# Patient Record
Sex: MALE | Race: BLACK OR AFRICAN AMERICAN | NOT HISPANIC OR LATINO | Employment: OTHER | URBAN - METROPOLITAN AREA
[De-identification: names, ages, dates, MRNs, and addresses within clinical notes are randomized per-mention and may not be internally consistent; named-entity substitution may affect disease eponyms.]

---

## 2023-08-27 PROBLEM — R94.31 ABNORMAL ELECTROCARDIOGRAM: Status: ACTIVE | Noted: 2023-08-27

## 2023-08-27 PROBLEM — R25.2 LEG CRAMPS: Status: ACTIVE | Noted: 2023-08-27

## 2023-08-27 PROBLEM — N40.0 BPH (BENIGN PROSTATIC HYPERPLASIA): Status: ACTIVE | Noted: 2023-08-27

## 2023-08-27 PROBLEM — R33.9 INCOMPLETE BLADDER EMPTYING: Status: ACTIVE | Noted: 2023-08-27

## 2023-08-27 PROBLEM — R14.0 ABDOMINAL BLOATING: Status: ACTIVE | Noted: 2023-08-27

## 2023-08-27 PROBLEM — R10.9 ABDOMINAL DISCOMFORT: Status: ACTIVE | Noted: 2023-08-27

## 2023-08-27 PROBLEM — I10 HYPERTENSION: Status: ACTIVE | Noted: 2023-08-27

## 2023-08-27 PROBLEM — R97.20 ELEVATED PSA: Status: ACTIVE | Noted: 2023-08-27

## 2023-08-27 PROBLEM — K58.9 IBS (IRRITABLE BOWEL SYNDROME): Status: ACTIVE | Noted: 2023-08-27

## 2023-08-27 PROBLEM — K64.9 HEMORRHOIDS: Status: ACTIVE | Noted: 2023-08-27

## 2023-08-27 PROBLEM — M21.40 PES PLANUS: Status: ACTIVE | Noted: 2023-08-27

## 2023-08-27 PROBLEM — E78.5 DYSLIPIDEMIA: Status: ACTIVE | Noted: 2023-08-27

## 2023-08-27 PROBLEM — R00.2 PALPITATIONS: Status: ACTIVE | Noted: 2023-08-27

## 2023-08-27 PROBLEM — K63.5 COLON POLYP: Status: ACTIVE | Noted: 2023-08-27

## 2023-08-27 PROBLEM — N13.8 BPH WITH OBSTRUCTION/LOWER URINARY TRACT SYMPTOMS: Status: ACTIVE | Noted: 2023-08-27

## 2023-08-27 PROBLEM — R07.9 CHEST PAIN: Status: ACTIVE | Noted: 2023-08-27

## 2023-08-27 PROBLEM — R94.39 ABNORMAL STRESS TEST: Status: ACTIVE | Noted: 2023-08-27

## 2023-08-27 PROBLEM — K63.89 BACTERIAL OVERGROWTH SYNDROME: Status: ACTIVE | Noted: 2023-08-27

## 2023-08-27 PROBLEM — S93.609A FOOT SPRAIN: Status: ACTIVE | Noted: 2023-08-27

## 2023-08-27 PROBLEM — R53.83 FATIGUE: Status: ACTIVE | Noted: 2023-08-27

## 2023-08-27 PROBLEM — M79.673 FOOT PAIN: Status: ACTIVE | Noted: 2023-08-27

## 2023-08-27 PROBLEM — S16.1XXA CERVICAL STRAIN: Status: ACTIVE | Noted: 2023-08-27

## 2023-08-27 PROBLEM — N40.1 BPH WITH OBSTRUCTION/LOWER URINARY TRACT SYMPTOMS: Status: ACTIVE | Noted: 2023-08-27

## 2023-08-27 PROBLEM — R42 DIZZINESS, NONSPECIFIC: Status: ACTIVE | Noted: 2023-08-27

## 2023-08-27 PROBLEM — K21.9 ESOPHAGEAL REFLUX: Status: ACTIVE | Noted: 2023-08-27

## 2023-08-27 PROBLEM — M54.50 LUMBAR BACK PAIN: Status: ACTIVE | Noted: 2023-08-27

## 2023-08-27 PROBLEM — R73.9 ELEVATED BLOOD SUGAR: Status: ACTIVE | Noted: 2023-08-27

## 2023-08-27 PROBLEM — R07.81 RIB PAIN ON RIGHT SIDE: Status: ACTIVE | Noted: 2023-08-27

## 2023-08-27 RX ORDER — LISINOPRIL AND HYDROCHLOROTHIAZIDE 10; 12.5 MG/1; MG/1
1 TABLET ORAL DAILY
COMMUNITY
End: 2023-12-06 | Stop reason: SDUPTHER

## 2023-08-29 LAB
ALANINE AMINOTRANSFERASE (SGPT) (U/L) IN SER/PLAS: 19 U/L (ref 10–52)
ALBUMIN (G/DL) IN SER/PLAS: 4 G/DL (ref 3.4–5)
ALKALINE PHOSPHATASE (U/L) IN SER/PLAS: 40 U/L (ref 33–136)
ANION GAP IN SER/PLAS: 12 MMOL/L (ref 10–20)
APPEARANCE, URINE: CLEAR
ASPARTATE AMINOTRANSFERASE (SGOT) (U/L) IN SER/PLAS: 20 U/L (ref 9–39)
BASOPHILS (10*3/UL) IN BLOOD BY AUTOMATED COUNT: 0.04 X10E9/L (ref 0–0.1)
BASOPHILS/100 LEUKOCYTES IN BLOOD BY AUTOMATED COUNT: 0.9 % (ref 0–2)
BILIRUBIN TOTAL (MG/DL) IN SER/PLAS: 0.7 MG/DL (ref 0–1.2)
BILIRUBIN, URINE: NEGATIVE
BLOOD, URINE: ABNORMAL
CALCIUM (MG/DL) IN SER/PLAS: 8.6 MG/DL (ref 8.6–10.3)
CARBON DIOXIDE, TOTAL (MMOL/L) IN SER/PLAS: 26 MMOL/L (ref 21–32)
CHLORIDE (MMOL/L) IN SER/PLAS: 103 MMOL/L (ref 98–107)
COLOR, URINE: YELLOW
CREATININE (MG/DL) IN SER/PLAS: 1.19 MG/DL (ref 0.5–1.3)
EOSINOPHILS (10*3/UL) IN BLOOD BY AUTOMATED COUNT: 0.19 X10E9/L (ref 0–0.4)
EOSINOPHILS/100 LEUKOCYTES IN BLOOD BY AUTOMATED COUNT: 4.4 % (ref 0–6)
ERYTHROCYTE DISTRIBUTION WIDTH (RATIO) BY AUTOMATED COUNT: 13.3 % (ref 11.5–14.5)
ERYTHROCYTE MEAN CORPUSCULAR HEMOGLOBIN CONCENTRATION (G/DL) BY AUTOMATED: 31.8 G/DL (ref 32–36)
ERYTHROCYTE MEAN CORPUSCULAR VOLUME (FL) BY AUTOMATED COUNT: 84 FL (ref 80–100)
ERYTHROCYTES (10*6/UL) IN BLOOD BY AUTOMATED COUNT: 5.34 X10E12/L (ref 4.5–5.9)
GFR MALE: 64 ML/MIN/1.73M2
GLUCOSE (MG/DL) IN SER/PLAS: 111 MG/DL (ref 74–99)
GLUCOSE, URINE: NEGATIVE MG/DL
HEMATOCRIT (%) IN BLOOD BY AUTOMATED COUNT: 44.6 % (ref 41–52)
HEMOGLOBIN (G/DL) IN BLOOD: 14.2 G/DL (ref 13.5–17.5)
IMMATURE GRANULOCYTES/100 LEUKOCYTES IN BLOOD BY AUTOMATED COUNT: 0.2 % (ref 0–0.9)
KETONES, URINE: NEGATIVE MG/DL
LEUKOCYTE ESTERASE, URINE: NEGATIVE
LEUKOCYTES (10*3/UL) IN BLOOD BY AUTOMATED COUNT: 4.3 X10E9/L (ref 4.4–11.3)
LYMPHOCYTES (10*3/UL) IN BLOOD BY AUTOMATED COUNT: 1.18 X10E9/L (ref 0.8–3)
LYMPHOCYTES/100 LEUKOCYTES IN BLOOD BY AUTOMATED COUNT: 27.6 % (ref 13–44)
MONOCYTES (10*3/UL) IN BLOOD BY AUTOMATED COUNT: 0.48 X10E9/L (ref 0.05–0.8)
MONOCYTES/100 LEUKOCYTES IN BLOOD BY AUTOMATED COUNT: 11.2 % (ref 2–10)
NEUTROPHILS (10*3/UL) IN BLOOD BY AUTOMATED COUNT: 2.38 X10E9/L (ref 1.6–5.5)
NEUTROPHILS/100 LEUKOCYTES IN BLOOD BY AUTOMATED COUNT: 55.7 % (ref 40–80)
NITRITE, URINE: NEGATIVE
PH, URINE: 7 (ref 5–8)
PLATELETS (10*3/UL) IN BLOOD AUTOMATED COUNT: 252 X10E9/L (ref 150–450)
POTASSIUM (MMOL/L) IN SER/PLAS: 3.6 MMOL/L (ref 3.5–5.3)
PROTEIN TOTAL: 6.7 G/DL (ref 6.4–8.2)
PROTEIN, URINE: NEGATIVE MG/DL
RBC, URINE: <1 /HPF (ref 0–5)
SODIUM (MMOL/L) IN SER/PLAS: 137 MMOL/L (ref 136–145)
SPECIFIC GRAVITY, URINE: 1.01 (ref 1–1.03)
UREA NITROGEN (MG/DL) IN SER/PLAS: 11 MG/DL (ref 6–23)
UROBILINOGEN, URINE: <2 MG/DL (ref 0–1.9)
WBC, URINE: 1 /HPF (ref 0–5)

## 2023-11-14 ENCOUNTER — APPOINTMENT (OUTPATIENT)
Dept: CARDIOLOGY | Facility: CLINIC | Age: 73
End: 2023-11-14

## 2023-12-06 ENCOUNTER — OFFICE VISIT (OUTPATIENT)
Dept: OTOLARYNGOLOGY | Facility: CLINIC | Age: 73
End: 2023-12-06
Payer: MEDICARE

## 2023-12-06 VITALS
HEART RATE: 76 BPM | SYSTOLIC BLOOD PRESSURE: 119 MMHG | WEIGHT: 164 LBS | BODY MASS INDEX: 27.32 KG/M2 | HEIGHT: 65 IN | TEMPERATURE: 98.1 F | DIASTOLIC BLOOD PRESSURE: 72 MMHG

## 2023-12-06 DIAGNOSIS — J33.9 NASAL POLYP: Primary | ICD-10-CM

## 2023-12-06 DIAGNOSIS — J34.89 NASAL OBSTRUCTION: ICD-10-CM

## 2023-12-06 PROCEDURE — 1036F TOBACCO NON-USER: CPT | Performed by: NURSE PRACTITIONER

## 2023-12-06 PROCEDURE — 99214 OFFICE O/P EST MOD 30 MIN: CPT | Performed by: NURSE PRACTITIONER

## 2023-12-06 PROCEDURE — 99204 OFFICE O/P NEW MOD 45 MIN: CPT | Performed by: NURSE PRACTITIONER

## 2023-12-06 PROCEDURE — 1159F MED LIST DOCD IN RCRD: CPT | Performed by: NURSE PRACTITIONER

## 2023-12-06 PROCEDURE — 31231 NASAL ENDOSCOPY DX: CPT | Performed by: NURSE PRACTITIONER

## 2023-12-06 PROCEDURE — 3078F DIAST BP <80 MM HG: CPT | Performed by: NURSE PRACTITIONER

## 2023-12-06 PROCEDURE — 3074F SYST BP LT 130 MM HG: CPT | Performed by: NURSE PRACTITIONER

## 2023-12-06 PROCEDURE — 1126F AMNT PAIN NOTED NONE PRSNT: CPT | Performed by: NURSE PRACTITIONER

## 2023-12-06 RX ORDER — ACETAMINOPHEN 500 MG
TABLET ORAL EVERY 6 HOURS PRN
COMMUNITY

## 2023-12-06 RX ORDER — TAMSULOSIN HYDROCHLORIDE 0.4 MG/1
0.4 CAPSULE ORAL 2 TIMES DAILY
COMMUNITY
Start: 2023-10-25

## 2023-12-06 RX ORDER — LISINOPRIL AND HYDROCHLOROTHIAZIDE 12.5; 2 MG/1; MG/1
1 TABLET ORAL DAILY
COMMUNITY
Start: 2023-11-15

## 2023-12-06 ASSESSMENT — PATIENT HEALTH QUESTIONNAIRE - PHQ9
1. LITTLE INTEREST OR PLEASURE IN DOING THINGS: NOT AT ALL
2. FEELING DOWN, DEPRESSED OR HOPELESS: NOT AT ALL
SUM OF ALL RESPONSES TO PHQ9 QUESTIONS 1 AND 2: 0

## 2023-12-06 ASSESSMENT — COLUMBIA-SUICIDE SEVERITY RATING SCALE - C-SSRS
2. HAVE YOU ACTUALLY HAD ANY THOUGHTS OF KILLING YOURSELF?: NO
6. HAVE YOU EVER DONE ANYTHING, STARTED TO DO ANYTHING, OR PREPARED TO DO ANYTHING TO END YOUR LIFE?: NO
1. IN THE PAST MONTH, HAVE YOU WISHED YOU WERE DEAD OR WISHED YOU COULD GO TO SLEEP AND NOT WAKE UP?: NO

## 2023-12-06 ASSESSMENT — PAIN SCALES - GENERAL: PAINLEVEL: 0-NO PAIN

## 2023-12-06 NOTE — PATIENT INSTRUCTIONS
- Call [phone] to schedule your CT scan. I will follow up with results.     Welcome to Elsa Anayalucas's clinic. We are here to assist you through your ENT care at [facility].  Elsa is a Nurse Practitioner who specializes in General ENT. This means that she specializes in taking care of patients with usual ENT issues such as nasal congestion, allergy symptoms, sinusitis, hearing loss, ear infections, ear wax removal, hoarseness, sore throat, throat infections, reflux and some swallowing issues. She also sees patients regarding dizziness and vertigo.   Leisa is Elsa's  and she answers the office phone from 7:30am-4pm Mon-Fri. Call [phone]. She can help you with scheduling of appointments, and general questions and information. You may need to leave a message if she is helping another patient. In this case, someone from the team will call you back the same day if you leave your message before 3pm, or the next business morning.  Elsa currently sees patients at [facility] on Mondays, Wednesdays and Thursdays.  She works closely with audiologists to solve issues with hearing. She is also in very close contact with her collaborative physicians. Dr. Benitez, a surgeon who specializes in general ENT and rhinology. She also works closely with otologist (ear surgeon) Dr. Joe and head and neck surgery Dr. Dutton.   Others who may be included in your care are dieticians, social workers, allergists, gastroenterologists, neurologists, and physical therapists. Elsa will provide these referrals as needed. Please let her know if you would like to request a specific referral.  Elsa makes every effort to run on time for your appointments. Therefore, if you are more than 15 minutes late unrelated to a scan or another appointment such as therapy or audiology, your appointment will need to be rescheduled for another day. We appreciate your understanding.   We look  forward to working with you to meet your healthcare goals.

## 2023-12-06 NOTE — PROGRESS NOTES
Subjective   Patient ID: Keshawn Díaz is a 73 y.o. male who presents for New Patient Visit (Nasal polyps).    HPI  Patient here for evaluation of his nose.   He noticed a polyp in his right nostril in March. He has some trouble breathing through his nose at times.  Some nasal drainage. Would get nasal polyps taken out in the clinic, last time was in 1992. No trouble smelling. No facial pain. He uses Flonase PRN. No other surgeries on his nose. No recent imaging of the nose.   He does have a history of allergies. No oral antihistamine.   Was on an antibiotic about 2 weeks ago for a sinus infection.  Former smoker.     Patient Active Problem List   Diagnosis    Abdominal bloating    Abdominal discomfort    Abnormal electrocardiogram    Abnormal stress test    Bacterial overgrowth syndrome    BPH (benign prostatic hyperplasia)    BPH with obstruction/lower urinary tract symptoms    Cervical strain    Chest pain    Colon polyp    Dizziness, nonspecific    Dyslipidemia    Elevated blood sugar    Elevated PSA    Esophageal reflux    Fatigue    Foot pain    Foot sprain    Hemorrhoids    Hypertension    IBS (irritable bowel syndrome)    Incomplete bladder emptying    Leg cramps    Lumbar back pain    Palpitations    Pes planus    Rib pain on right side     Past Surgical History:   Procedure Laterality Date    OTHER SURGICAL HISTORY  11/07/2022    Hemorrhoidectomy     Review of Systems    Objective   Physical Exam    Constitutional: No fever, chills, weight loss or weight gain  General appearance: Appears well, well-nourished, well groomed. No acute distress.    Communication: Normal communication    Psychiatric: Oriented to person, place and time. Normal mood and affect.    Neurologic: Cranial nerves II-XII grossly intact and symmetric bilaterally.    Head and Face:  Head: Atraumatic with no masses, lesions or scarring.  Face: Normal symmetry. No scars or deformities.  TMJ: Normal, no trismus.    Eyes: Conjunctiva not  edematous or erythematous.     Right Ear: External inspection of ear with no deformity, scars, or masses. EAC is clear.  TM is intact with no sign of infection, effusion, or retraction.  No perforation seen.     Left Ear: External inspection of ear with no deformity, scars, or masses. EAC is clear.  TM is intact with no sign of infection, effusion, or retraction.  No perforation seen.     Nose: External inspection of nose: No nasal lesions, lacerations or scars. Anterior rhinoscopy with limited visualization past the inferior turbinates but polypoid tissue noted bilaterally. No tenderness on frontal or maxillary sinus palpation.    Oral Cavity/Mouth: Oral cavity and oropharynx mucosa moist and pink. No lesions or masses. Dentition normal. Tonsils appear normal. Uvula is midline. Tongue with no masses or lesions. Tongue with good mobility. The oropharynx is clear.    Neck: Normal appearing, symmetric, trachea midline.     Cardiovascular: Examination of peripheral vascular system shows no clubbing or cyanosis.    Respiratory: No respiratory distress increased work of breathing. Inspection of the chest with symmetric chest expansion and normal respiratory effort.    Skin: No head and neck rashes.    Lymph nodes: No adenopathy.     Nasal / sinus endoscopy    Date/Time: 12/6/2023 11:05 AM    Performed by: SHERRI Melchor  Authorized by: SHERRI Melchor    Procedure details:     Medication:  Afrin (4% topical lidcoaine)    Instrument: flexible fiberoptic nasal endoscope      Scope location: bilateral nare    Nasal cavity:     right nasal cavity occluded, left nasal cavity occluded, right nasal cavity polyp(s) and left nasal cavity polyp(s)      Right inferior turbinates:      edema and polypoid degeneration      Left inferior turbinates:      edema and polypoid degeneration    Septum:     normal    Sinus:     Right middle meatus:       normal        patent      Left middle meatus:       normal         patent    Right nasopharynx:       normal    Left nasopharynx:       normal    Assessment/Plan   Diagnoses and all orders for this visit:  Nasal obstruction, Nasal polyp       - We discussed the etiology of nasal polyps. We discussed doing a high dose steroid taper, but patient is prediabetic so we will defer at this time.        - I recommend using Flonase daily.   -     CT sinuss wo IV contrast volumetric surgical planning; Future. Patient was recently on antibiotics and not having signs of a sinus infection. I will follow up with results.     All questions answered to patient satisfaction.     MIKE Melchor-CNP 12/06/23 10:46 AM

## 2023-12-21 ENCOUNTER — TELEPHONE (OUTPATIENT)
Dept: OTOLARYNGOLOGY | Facility: CLINIC | Age: 73
End: 2023-12-21
Payer: MEDICARE

## 2023-12-21 ENCOUNTER — ANCILLARY PROCEDURE (OUTPATIENT)
Dept: RADIOLOGY | Facility: CLINIC | Age: 73
End: 2023-12-21
Payer: MEDICARE

## 2023-12-21 DIAGNOSIS — J33.9 NASAL POLYP: ICD-10-CM

## 2023-12-21 PROCEDURE — 76376 3D RENDER W/INTRP POSTPROCES: CPT | Performed by: RADIOLOGY

## 2023-12-21 PROCEDURE — 70486 CT MAXILLOFACIAL W/O DYE: CPT | Performed by: RADIOLOGY

## 2023-12-21 PROCEDURE — 70486 CT MAXILLOFACIAL W/O DYE: CPT

## 2023-12-21 NOTE — TELEPHONE ENCOUNTER
Result Communication    Resulted Orders   CT sinuss wo IV contrast volumetric surgical planning    Narrative    Interpreted By:  Vamsi Upton,   STUDY:  CT paranasal sinuses      COMPARISON:  none      ACCESSION NUMBER(S):  [id number]      ORDERING CLINICIAN:  WENDY ELLISSYDNEY      TECHNIQUE:  CT of the paranasal sinuses was performed with multiplanar  reconstruction and 3D reconstruction      FINDINGS:  *Nasal fossa extensive polypoid soft tissue thickening with surgical  absence or demineralization of the middle turbinates. Bilateral intra  nasal airway narrowing. *Frontal sinuses:  Minimal mucosal thickening  left frontal sinus. The right frontal sinus is clear *Ethmoid  sinuses:  Opacification of anterior ethmoid air cells bilaterally  without bone remottling *Maxillary sinuses:  Soft tissue narrowing of  the left infundibulum. The right infundibulum is normal size.  Polypoid mucosal thickening in the maxillary sinuses bilaterally  *Sphenoid sinuses:  Normal *Mastoid sinuses: Normal  *Maxilla and mandible: Normal      ORBITS:  Normal        Impression    Sinonasal polyposis as described with postobstructive changes      No evidence of free fluid, mucocele formation or osteomyelitis.      MACRO:  none      Signed by: Vamsi Upton 12/21/2023 9:42 AM  Dictation workstation:   [id number]       4:20 PM    Spoke with patient. Will refer to Dr. Benitez to discuss surgical options.   All questions answered to patient satisfaction.     Results were successfully communicated with the patient and they acknowledged their understanding.

## 2024-02-21 ENCOUNTER — OFFICE VISIT (OUTPATIENT)
Dept: OTOLARYNGOLOGY | Facility: CLINIC | Age: 74
End: 2024-02-21
Payer: MEDICARE

## 2024-02-21 VITALS
SYSTOLIC BLOOD PRESSURE: 126 MMHG | TEMPERATURE: 97.3 F | RESPIRATION RATE: 16 BRPM | HEART RATE: 61 BPM | BODY MASS INDEX: 28.06 KG/M2 | WEIGHT: 168.4 LBS | OXYGEN SATURATION: 99 % | HEIGHT: 65 IN | DIASTOLIC BLOOD PRESSURE: 78 MMHG

## 2024-02-21 DIAGNOSIS — J33.9 NASAL POLYP: ICD-10-CM

## 2024-02-21 DIAGNOSIS — J32.4 CHRONIC PANSINUSITIS: Primary | ICD-10-CM

## 2024-02-21 DIAGNOSIS — J31.0 CHRONIC RHINITIS: ICD-10-CM

## 2024-02-21 DIAGNOSIS — J34.89 NASAL OBSTRUCTION: ICD-10-CM

## 2024-02-21 PROCEDURE — 1126F AMNT PAIN NOTED NONE PRSNT: CPT | Performed by: STUDENT IN AN ORGANIZED HEALTH CARE EDUCATION/TRAINING PROGRAM

## 2024-02-21 PROCEDURE — 99203 OFFICE O/P NEW LOW 30 MIN: CPT | Performed by: STUDENT IN AN ORGANIZED HEALTH CARE EDUCATION/TRAINING PROGRAM

## 2024-02-21 PROCEDURE — 1159F MED LIST DOCD IN RCRD: CPT | Performed by: STUDENT IN AN ORGANIZED HEALTH CARE EDUCATION/TRAINING PROGRAM

## 2024-02-21 PROCEDURE — 3074F SYST BP LT 130 MM HG: CPT | Performed by: STUDENT IN AN ORGANIZED HEALTH CARE EDUCATION/TRAINING PROGRAM

## 2024-02-21 PROCEDURE — 1160F RVW MEDS BY RX/DR IN RCRD: CPT | Performed by: STUDENT IN AN ORGANIZED HEALTH CARE EDUCATION/TRAINING PROGRAM

## 2024-02-21 PROCEDURE — 3078F DIAST BP <80 MM HG: CPT | Performed by: STUDENT IN AN ORGANIZED HEALTH CARE EDUCATION/TRAINING PROGRAM

## 2024-02-21 PROCEDURE — 99213 OFFICE O/P EST LOW 20 MIN: CPT | Performed by: STUDENT IN AN ORGANIZED HEALTH CARE EDUCATION/TRAINING PROGRAM

## 2024-02-21 PROCEDURE — 1036F TOBACCO NON-USER: CPT | Performed by: STUDENT IN AN ORGANIZED HEALTH CARE EDUCATION/TRAINING PROGRAM

## 2024-02-21 RX ORDER — FINASTERIDE 5 MG/1
5 TABLET, FILM COATED ORAL DAILY
COMMUNITY
Start: 2024-01-08

## 2024-02-21 RX ORDER — BLOOD SUGAR DIAGNOSTIC
100 STRIP MISCELLANEOUS
COMMUNITY
Start: 2020-10-09

## 2024-02-21 RX ORDER — LINACLOTIDE 145 UG/1
CAPSULE, GELATIN COATED ORAL
COMMUNITY
Start: 2024-01-09

## 2024-02-21 SDOH — ECONOMIC STABILITY: FOOD INSECURITY: WITHIN THE PAST 12 MONTHS, THE FOOD YOU BOUGHT JUST DIDN'T LAST AND YOU DIDN'T HAVE MONEY TO GET MORE.: NEVER TRUE

## 2024-02-21 SDOH — ECONOMIC STABILITY: FOOD INSECURITY: WITHIN THE PAST 12 MONTHS, YOU WORRIED THAT YOUR FOOD WOULD RUN OUT BEFORE YOU GOT MONEY TO BUY MORE.: NEVER TRUE

## 2024-02-21 ASSESSMENT — LIFESTYLE VARIABLES
HOW OFTEN DO YOU HAVE A DRINK CONTAINING ALCOHOL: NEVER
SKIP TO QUESTIONS 9-10: 1
HOW MANY STANDARD DRINKS CONTAINING ALCOHOL DO YOU HAVE ON A TYPICAL DAY: PATIENT DOES NOT DRINK
HOW OFTEN DO YOU HAVE SIX OR MORE DRINKS ON ONE OCCASION: NEVER
AUDIT-C TOTAL SCORE: 0

## 2024-02-21 ASSESSMENT — ENCOUNTER SYMPTOMS
LOSS OF SENSATION IN FEET: 0
DEPRESSION: 0
OCCASIONAL FEELINGS OF UNSTEADINESS: 0

## 2024-02-21 ASSESSMENT — PAIN SCALES - GENERAL: PAINLEVEL: 0-NO PAIN

## 2024-02-21 NOTE — PROGRESS NOTES
SUBJECTIVE  Patient ID: Keshawn Díaz is a 73 y.o. male who presents for Follow-up (Follow up).    Referred by Elsa Ramos.  Last note and endoscopy reviewed.    The patient reports that he has noted increased in nasal congestion for 2-6 months. Worse on the right side. He reports longstanding history of CRSwNP. Reports that he had back-to-back surgeries for many years until 1992 when he had a larger surgery that kept him open for some time. He has many environmental allergies.  Most significant symptoms are nasal obstruction and congestion requiring frequent blowing of his nose to clear drainage.  Not actively having facial pressure nor pain.  Gets epistaxis only with digital manipulation.    Patient reports that he has not needed Flonase or Afrin for the last several weeks.  He was previously recommended to use Flonase on a regular basis.  Did not appreciate significant improvement with nasal sprays.  Has used saline spray as needed.  Reports that he started a new prostate medication, finasteride, which he believes is helping his polyps.    Review of Systems  Complete ROS negative except as noted above or on patient intake form and as above.    OBJECTIVE  Physical Exam  CONSTITUTIONAL: Well appearing male who appears stated age.  PSYCHIATRIC: Alert, appropriate mood and affect.  RESPIRATORY: Normal inspiration and expiration and chest wall expansion; no use of accessory muscles to breathe.  VOICE: Clear speech without hoarseness. No stridor nor stertor.  HEAD, FACE, AND SKIN: Symmetric facial feature. No cutaneous masses or lesions were visualized. The parotid and submandibular glands were normal to palpation.  EYES: Pupils were equal in size and reactive to light. Extra-ocular muscle function was intact. No nystagmus was observed. Vision was grossly intact.  EARS: External ears were normally formed with no lesions. The external auditory canals were clear. The tympanic membranes were intact and in the neutral  position. No significant retraction pockets nor effusions were appreciated.  NOSE: Nasal dorsum was midline. Anterior rhinoscopy demonstrated a septum that appears relatively midline; some right superior deviation.  Right nasal cavity is completely obstructed by a large nasal polyp.  Further nasal polyposis appreciated at the left middle meatus.  ORAL CAVITY: Lips were without lesions. Moist mucous membranes. No lesions appreciated along the gingiva, oral mucosa, nor tongue.  DENTITION: Grossly normal without obvious infection nor inflammation.  OROPHARYNX: No lesion nor mucosal abnormality. The uvula was normal appearing. The tonsils were 1+.  No drainage.  NECK: Visualization and palpation of the neck revealed no mass lesions, no thyromegaly or thyroid masses. No cutaneous lesions appreciated.  LYMPHATICS (CERVICAL): There were no palpable lymph nodes in the posterior triangle, submandibular triangle, jugulodigastric region, nor central neck.  NEUROLOGIC: Cranial nerves III, IV, and VI were noted to be intact via extra-ocular muscle movement testing. Cranial nerve V was noted to be intact to soft touch bilaterally. Cranial nerve VII was noted to be intact and symmetric by facial movement. Cranial nerve VIII was tested with normal voice examination and revealed grossly normal hearing. Cranial nerves IX and X noted to be intact by palatal movement. Cranial nerve XI noted to be intact via shoulder shrug.  Cranial nerve XII noted to be intact with active and symmetric tongue movement.     Radiology  CT sinus 12/21/2023: I personally reviewed the patient's recent imaging.  This demonstrates varying levels of pansinusitis with prominent soft tissue opacifications with the nasal cavity consistent with nasal polyposis.  There appears to be evidence of prior septoplasty with residual right deviation.      ASSESSMENT/PLAN  Diagnoses and all orders for this visit:  Chronic pansinusitis  Nasal polyp  Chronic rhinitis  Nasal  obstruction    73 y.o. male presenting with progressive nasal obstruction in the setting of longstanding history of chronic sinusitis and nasal polyposis.    1.  Chronic rhinitis, chronic pansinusitis, nasal polyposis, nasal obstruction  The patient has notable pansinusitis with varying levels of mucosal thickening.  Thick nasal polyposis appreciated on exam with near complete obstruction of the right nasal cavity.  Nasal polyposis noted within the left side as well.  The patient reports prior surgeries, the last of which was in 1992.  He notes longstanding history of environmental allergies.  Recently has noted worsening obstruction that has not responded to nasal sprays.  However, with a new prostate medication he is appreciated some opening of the nasal cavity and improvement in breathing.    We discussed his options moving forward including some newer medications that have come on the market to help shrink nasal polyps.  However, we discussed that given the size of his nasal polyps these are unlikely to fully resolve without surgery.  The patient understands but given that he has noted some improvement in breathing would like to hold off on any interventions.  He is focusing on the prostate at this time but will follow-up if he wishes to pursue procedural intervention.    This note was created using speech recognition transcription software. Despite proofreading, typographical errors may be present that affect the meaning of the content. Please contact my office with any questions.

## 2024-02-21 NOTE — LETTER
February 27, 2024     Elsa Ramos, APRN-CNP  [address]  [place] [place] [address]    Patient: Keshawn Díaz   YOB: 1950   Date of Visit: 2/21/2024       Dear Dr. Elsa Ramos, APRN-CNP:    Thank you for referring Keshawn Díaz to me for evaluation. Below are my notes for this consultation.  If you have questions, please do not hesitate to call me. I look forward to following your patient along with you.       Sincerely,     Vishal Benitez MD      CC: No Recipients  ______________________________________________________________________________________    SUBJECTIVE  Patient ID: Keshawn Díaz is a 73 y.o. male who presents for Follow-up (Follow up).    Referred by Elsa Ramos.  Last note and endoscopy reviewed.    The patient reports that he has noted increased in nasal congestion for 2-6 months. Worse on the right side. He reports longstanding history of CRSwNP. Reports that he had back-to-back surgeries for many years until 1992 when he had a larger surgery that kept him open for some time. He has many environmental allergies.  Most significant symptoms are nasal obstruction and congestion requiring frequent blowing of his nose to clear drainage.  Not actively having facial pressure nor pain.  Gets epistaxis only with digital manipulation.    Patient reports that he has not needed Flonase or Afrin for the last several weeks.  He was previously recommended to use Flonase on a regular basis.  Did not appreciate significant improvement with nasal sprays.  Has used saline spray as needed.  Reports that he started a new prostate medication, finasteride, which he believes is helping his polyps.    Review of Systems  Complete ROS negative except as noted above or on patient intake form and as above.    OBJECTIVE  Physical Exam  CONSTITUTIONAL: Well appearing male who appears stated age.  PSYCHIATRIC: Alert, appropriate mood and affect.  RESPIRATORY: Normal  inspiration and expiration and chest wall expansion; no use of accessory muscles to breathe.  VOICE: Clear speech without hoarseness. No stridor nor stertor.  HEAD, FACE, AND SKIN: Symmetric facial feature. No cutaneous masses or lesions were visualized. The parotid and submandibular glands were normal to palpation.  EYES: Pupils were equal in size and reactive to light. Extra-ocular muscle function was intact. No nystagmus was observed. Vision was grossly intact.  EARS: External ears were normally formed with no lesions. The external auditory canals were clear. The tympanic membranes were intact and in the neutral position. No significant retraction pockets nor effusions were appreciated.  NOSE: Nasal dorsum was midline. Anterior rhinoscopy demonstrated a septum that appears relatively midline; some right superior deviation.  Right nasal cavity is completely obstructed by a large nasal polyp.  Further nasal polyposis appreciated at the left middle meatus.  ORAL CAVITY: Lips were without lesions. Moist mucous membranes. No lesions appreciated along the gingiva, oral mucosa, nor tongue.  DENTITION: Grossly normal without obvious infection nor inflammation.  OROPHARYNX: No lesion nor mucosal abnormality. The uvula was normal appearing. The tonsils were 1+.  No drainage.  NECK: Visualization and palpation of the neck revealed no mass lesions, no thyromegaly or thyroid masses. No cutaneous lesions appreciated.  LYMPHATICS (CERVICAL): There were no palpable lymph nodes in the posterior triangle, submandibular triangle, jugulodigastric region, nor central neck.  NEUROLOGIC: Cranial nerves III, IV, and VI were noted to be intact via extra-ocular muscle movement testing. Cranial nerve V was noted to be intact to soft touch bilaterally. Cranial nerve VII was noted to be intact and symmetric by facial movement. Cranial nerve VIII was tested with normal voice examination and revealed grossly normal hearing. Cranial nerves IX  and X noted to be intact by palatal movement. Cranial nerve XI noted to be intact via shoulder shrug.  Cranial nerve XII noted to be intact with active and symmetric tongue movement.     Radiology  CT sinus 12/21/2023: I personally reviewed the patient's recent imaging.  This demonstrates varying levels of pansinusitis with prominent soft tissue opacifications with the nasal cavity consistent with nasal polyposis.  There appears to be evidence of prior septoplasty with residual right deviation.      ASSESSMENT/PLAN  Diagnoses and all orders for this visit:  Chronic pansinusitis  Nasal polyp  Chronic rhinitis  Nasal obstruction    73 y.o. male presenting with progressive nasal obstruction in the setting of longstanding history of chronic sinusitis and nasal polyposis.    1.  Chronic rhinitis, chronic pansinusitis, nasal polyposis, nasal obstruction  The patient has notable pansinusitis with varying levels of mucosal thickening.  Thick nasal polyposis appreciated on exam with near complete obstruction of the right nasal cavity.  Nasal polyposis noted within the left side as well.  The patient reports prior surgeries, the last of which was in 1992.  He notes longstanding history of environmental allergies.  Recently has noted worsening obstruction that has not responded to nasal sprays.  However, with a new prostate medication he is appreciated some opening of the nasal cavity and improvement in breathing.    We discussed his options moving forward including some newer medications that have come on the market to help shrink nasal polyps.  However, we discussed that given the size of his nasal polyps these are unlikely to fully resolve without surgery.  The patient understands but given that he has noted some improvement in breathing would like to hold off on any interventions.  He is focusing on the prostate at this time but will follow-up if he wishes to pursue procedural intervention.    This note was created using  speech recognition transcription software. Despite proofreading, typographical errors may be present that affect the meaning of the content. Please contact my office with any questions.

## 2024-02-27 PROBLEM — J34.89 NASAL OBSTRUCTION: Status: ACTIVE | Noted: 2024-02-27

## 2024-02-27 PROBLEM — J33.9 NASAL POLYP: Status: ACTIVE | Noted: 2024-02-27

## 2024-02-27 PROBLEM — J32.4 CHRONIC PANSINUSITIS: Status: ACTIVE | Noted: 2024-02-27

## 2024-02-27 PROBLEM — J31.0 CHRONIC RHINITIS: Status: ACTIVE | Noted: 2024-02-27

## 2024-06-18 ENCOUNTER — OFFICE VISIT (OUTPATIENT)
Dept: DERMATOLOGY | Facility: CLINIC | Age: 74
End: 2024-06-18
Payer: MEDICARE

## 2024-06-18 DIAGNOSIS — D48.5 NEOPLASM OF UNCERTAIN BEHAVIOR OF SKIN: Primary | ICD-10-CM

## 2024-06-18 DIAGNOSIS — L28.0 LICHEN SIMPLEX CHRONICUS: ICD-10-CM

## 2024-06-18 RX ORDER — TACROLIMUS 1 MG/G
OINTMENT TOPICAL NIGHTLY
Qty: 60 G | Refills: 3 | Status: SHIPPED | OUTPATIENT
Start: 2024-06-18 | End: 2025-06-18

## 2024-06-18 RX ORDER — KETOCONAZOLE 20 MG/G
CREAM TOPICAL DAILY
Qty: 60 G | Refills: 3 | Status: SHIPPED | OUTPATIENT
Start: 2024-06-18

## 2024-06-18 ASSESSMENT — DERMATOLOGY PATIENT ASSESSMENT
DO YOU USE A TANNING BED: NO
DO YOU HAVE ANY NEW OR CHANGING LESIONS: NO
ARE YOU AN ORGAN TRANSPLANT RECIPIENT: NO
HAVE YOU HAD OR DO YOU HAVE VASCULAR DISEASE: NO
HAVE YOU HAD OR DO YOU HAVE A STAPH INFECTION: NO

## 2024-06-18 ASSESSMENT — PATIENT GLOBAL ASSESSMENT (PGA): PATIENT GLOBAL ASSESSMENT: PATIENT GLOBAL ASSESSMENT:  1 - CLEAR

## 2024-06-18 ASSESSMENT — DERMATOLOGY QUALITY OF LIFE (QOL) ASSESSMENT
RATE HOW EMOTIONALLY BOTHERED YOU ARE BY YOUR SKIN PROBLEM (FOR EXAMPLE, WORRY, EMBARRASSMENT, FRUSTRATION): 0 - NEVER BOTHERED
RATE HOW BOTHERED YOU ARE BY EFFECTS OF YOUR SKIN PROBLEMS ON YOUR ACTIVITIES (EG, GOING OUT, ACCOMPLISHING WHAT YOU WANT, WORK ACTIVITIES OR YOUR RELATIONSHIPS WITH OTHERS): 0 - NEVER BOTHERED
DATE THE QUALITY-OF-LIFE ASSESSMENT WAS COMPLETED: 67009
ARE THERE EXCLUSIONS OR EXCEPTIONS FOR THE QUALITY OF LIFE ASSESSMENT: NO
RATE HOW BOTHERED YOU ARE BY SYMPTOMS OF YOUR SKIN PROBLEM (EG, ITCHING, STINGING BURNING, HURTING OR SKIN IRRITATION): 0 - NEVER BOTHERED

## 2024-06-18 ASSESSMENT — ITCH NUMERIC RATING SCALE: HOW SEVERE IS YOUR ITCHING?: 2

## 2024-06-18 NOTE — PROGRESS NOTES
Subjective     Keshawn Díaz is a 74 y.o. male who presents for the following: Rash (Bumps in groin area).     Review of Systems:  No other skin or systemic complaints other than what is documented elsewhere in the note.    The following portions of the chart were reviewed this encounter and updated as appropriate:   Tobacco  Allergies  Meds  Problems  Med Hx  Surg Hx         Skin Cancer History  No skin cancer on file.      Specialty Problems    None       Objective   Well appearing patient in no apparent distress; mood and affect are within normal limits.    A focused skin examination was performed. All findings within normal limits unless otherwise noted below.    Assessment/Plan   1. Neoplasm of uncertain behavior of skin  Right Inguinal Area  Verrucous lichenified papule with central erosion. There is a similar appearing papule in the left inguinal fold as well, and conspicuously symmetrical.          Lesion biopsy  Type of biopsy: tangential    Informed consent: discussed and consent obtained    Timeout: patient name, date of birth, surgical site, and procedure verified    Procedure prep:  Patient was prepped and draped  Anesthesia: the lesion was anesthetized in a standard fashion    Anesthetic:  1% lidocaine w/ epinephrine 1-100,000 local infiltration  Instrument used: DermaBlade    Hemostasis achieved with: aluminum chloride    Outcome: patient tolerated procedure well    Post-procedure details: sterile dressing applied and wound care instructions given    Dressing type: petrolatum and bandage      Staff Communication: Dermatology Local Anesthesia: 1 % Lidocaine / Epinephrine - Amount: 2 cc    Specimen 1 - Dermatopathology- DERM LAB  Differential Diagnosis: verruca vulgaris versus spiradenoma versus ISK versus traumatized skin tag  Check Margins Yes/No?:    Comments:    Dermpath Lab: Routine Histopathology (formalin-fixed tissue)    Recommend biopsy for further evaluation today. Patient expressed  agreement with plan.    2. Lichen simplex chronicus  Gluteal Crease, Perianal Skin, Perineum, Posterior Scrotum  Slightly thickened and lichenified plaques     Lichen simplex chronicus  - The nature of the disorder was reviewed and association with chronic scratching, friction, and/or rubbing.   - Keep nails trimmed short.  - Avoid friction/scratching as able.   - Treatment options including topical and intralesional corticosteroids reviewed and risks discussed.   - Start tacrolimus applied at night and ketoconazole 2% cream applied in morning.    Related Medications  tacrolimus (Protopic) 0.1 % ointment  Apply topically once daily at bedtime.    ketoconazole (NIZOral) 2 % cream  Apply topically once daily. In morning      Finesse Samuel DO, MPH  PGY-4, Department of Dermatology    I was present during all key portions of visit including history, exam, discussion/plan and/or procedures and directly supervised our resident during all portions of the visit, follow up care, medications and more    Jose D Germain MD

## 2024-06-21 LAB
LABORATORY COMMENT REPORT: NORMAL
PATH REPORT.FINAL DX SPEC: NORMAL
PATH REPORT.GROSS SPEC: NORMAL
PATH REPORT.MICROSCOPIC SPEC OTHER STN: NORMAL
PATH REPORT.RELEVANT HX SPEC: NORMAL
PATH REPORT.TOTAL CANCER: NORMAL

## 2024-08-20 ENCOUNTER — APPOINTMENT (OUTPATIENT)
Dept: DERMATOLOGY | Facility: CLINIC | Age: 74
End: 2024-08-20
Payer: MEDICARE

## 2024-08-20 DIAGNOSIS — L28.0 LICHEN SIMPLEX CHRONICUS: Primary | ICD-10-CM

## 2024-08-20 PROCEDURE — 99213 OFFICE O/P EST LOW 20 MIN: CPT | Performed by: STUDENT IN AN ORGANIZED HEALTH CARE EDUCATION/TRAINING PROGRAM

## 2024-08-20 PROCEDURE — 1159F MED LIST DOCD IN RCRD: CPT | Performed by: STUDENT IN AN ORGANIZED HEALTH CARE EDUCATION/TRAINING PROGRAM

## 2024-08-20 ASSESSMENT — DERMATOLOGY QUALITY OF LIFE (QOL) ASSESSMENT
ARE THERE EXCLUSIONS OR EXCEPTIONS FOR THE QUALITY OF LIFE ASSESSMENT: NO
WHAT SINGLE SKIN CONDITION LISTED BELOW IS THE PATIENT ANSWERING THE QUALITY-OF-LIFE ASSESSMENT QUESTIONS ABOUT: NONE OF THE ABOVE
RATE HOW EMOTIONALLY BOTHERED YOU ARE BY YOUR SKIN PROBLEM (FOR EXAMPLE, WORRY, EMBARRASSMENT, FRUSTRATION): 0 - NEVER BOTHERED
RATE HOW BOTHERED YOU ARE BY EFFECTS OF YOUR SKIN PROBLEMS ON YOUR ACTIVITIES (EG, GOING OUT, ACCOMPLISHING WHAT YOU WANT, WORK ACTIVITIES OR YOUR RELATIONSHIPS WITH OTHERS): 0 - NEVER BOTHERED
DATE THE QUALITY-OF-LIFE ASSESSMENT WAS COMPLETED: 67072
RATE HOW BOTHERED YOU ARE BY SYMPTOMS OF YOUR SKIN PROBLEM (EG, ITCHING, STINGING BURNING, HURTING OR SKIN IRRITATION): 0 - NEVER BOTHERED

## 2024-08-20 ASSESSMENT — DERMATOLOGY PATIENT ASSESSMENT
ARE YOU AN ORGAN TRANSPLANT RECIPIENT: NO
HAVE YOU HAD OR DO YOU HAVE A STAPH INFECTION: NO
HAVE YOU HAD OR DO YOU HAVE VASCULAR DISEASE: NO
DO YOU HAVE ANY NEW OR CHANGING LESIONS: NO
DO YOU USE A TANNING BED: NO

## 2024-08-20 ASSESSMENT — PATIENT GLOBAL ASSESSMENT (PGA): PATIENT GLOBAL ASSESSMENT: PATIENT GLOBAL ASSESSMENT:  1 - CLEAR

## 2024-08-20 ASSESSMENT — ITCH NUMERIC RATING SCALE: HOW SEVERE IS YOUR ITCHING?: 5

## 2024-08-20 NOTE — PROGRESS NOTES
Subjective     Keshawn Díaz is a 74 y.o. male who presents for the following: Rash (Follow up in groin area).     73yo M last seen 6/18 for LSC involving the gluteal crease, perianal skin, perineum, and posterior scrotum - given tacrolimus 0.1% ointment QPM and ketoconazole 2% cream QAM. Biopsy also performed on R inguinal area that came back as a prurigo nodule.    Review of Systems:  No other skin or systemic complaints other than what is documented elsewhere in the note.    The following portions of the chart were reviewed this encounter and updated as appropriate:       Skin Cancer History  No skin cancer on file.    Specialty Problems    None    Past Medical History:  Keshawn Díaz  has a past medical history of High blood pressure.    Past Surgical History:  Keshawn Díaz  has a past surgical history that includes Other surgical history (11/07/2022) and Cardiac surgery.    Family History:  Patient family history includes Diabetes in an other family member; Lung cancer in his father.    Social History:  Keshawn Díaz  reports that he has quit smoking. His smoking use included cigarettes and pipe. He has never used smokeless tobacco. He reports that he does not currently use alcohol. He reports that he does not use drugs.    Allergies:  Animal dander, Cigarette smoke, and Statins-hmg-coa reductase inhibitors    Current Medications / CAM's:    Current Outpatient Medications:     acetaminophen (Tylenol) 500 mg tablet, Take by mouth every 6 hours if needed., Disp: , Rfl:     apixaban (Eliquis) 5 mg tablet, Take 1 tablet (5 mg) by mouth 2 times a day., Disp: , Rfl:     finasteride (Proscar) 5 mg tablet, Take 1 tablet (5 mg) by mouth once daily., Disp: , Rfl:     ketoconazole (NIZOral) 2 % cream, Apply topically once daily. In morning, Disp: 60 g, Rfl: 3    Linzess 145 mcg capsule, TAKE ONE CAPSULE BY MOUTH EVERY DAY on an empty stomach at least 30 minutes before first meal of the day, Disp: , Rfl:      lisinopriL-hydrochlorothiazide 20-12.5 mg tablet, Take 1 tablet by mouth once daily., Disp: , Rfl:     OneTouch Ultra Test strip, 100 strips., Disp: , Rfl:     tacrolimus (Protopic) 0.1 % ointment, Apply topically once daily at bedtime., Disp: 60 g, Rfl: 3    tamsulosin (Flomax) 0.4 mg 24 hr capsule, Take 1 capsule (0.4 mg) by mouth 2 times a day., Disp: , Rfl:      Objective   Well appearing patient in no apparent distress; mood and affect are within normal limits.    A focused examination was performed including the bilateral inguinal folds and scrotum. All findings within normal limits unless otherwise noted below.    Assessment/Plan   1. Lichen simplex chronicus  Gluteal Crease, Left Inguinal Area, Right Inguinal Area  Post-inflammatory hyperpigmentation noted on bilateral inguinal folds. On the left inguinal fold is a firm, exophytic skin colored papule     Lichen simplex chronicus - improved  - The nature of the disorder was reviewed and association with chronic scratching, friction, and/or rubbing.   - Continue tacrolimus applied at night and ketoconazole 2% cream applied in morning as needed  - Discussed that the lesion on the left inguinal fold is likely also a prurigo nodule - patient will schedule at a different time for removal if he wishes to further pursue it    Related Medications  tacrolimus (Protopic) 0.1 % ointment  Apply topically once daily at bedtime.    ketoconazole (NIZOral) 2 % cream  Apply topically once daily. In morning      Marshall River DO  PGY-4 Dermatology    I was present during all key portions of visit including history, exam, discussion/plan and/or procedures and directly supervised our resident during all portions of the visit, follow up care, medications and more    Jose D Germain MD

## 2025-03-24 ENCOUNTER — HOSPITAL ENCOUNTER (OUTPATIENT)
Dept: RADIOLOGY | Facility: HOSPITAL | Age: 75
Discharge: HOME | End: 2025-03-24
Payer: MEDICARE

## 2025-03-24 DIAGNOSIS — M54.50 LOW BACK PAIN, UNSPECIFIED: ICD-10-CM

## 2025-03-24 PROCEDURE — 74018 RADEX ABDOMEN 1 VIEW: CPT | Performed by: RADIOLOGY

## 2025-03-24 PROCEDURE — 72100 X-RAY EXAM L-S SPINE 2/3 VWS: CPT | Performed by: RADIOLOGY

## 2025-03-24 PROCEDURE — 74018 RADEX ABDOMEN 1 VIEW: CPT

## 2025-03-24 PROCEDURE — 72100 X-RAY EXAM L-S SPINE 2/3 VWS: CPT
